# Patient Record
Sex: FEMALE | Race: WHITE | URBAN - METROPOLITAN AREA
[De-identification: names, ages, dates, MRNs, and addresses within clinical notes are randomized per-mention and may not be internally consistent; named-entity substitution may affect disease eponyms.]

---

## 2017-01-05 PROCEDURE — 88175 CYTOPATH C/V AUTO FLUID REDO: CPT | Performed by: OBSTETRICS & GYNECOLOGY

## 2017-01-05 PROCEDURE — 87624 HPV HI-RISK TYP POOLED RSLT: CPT | Performed by: OBSTETRICS & GYNECOLOGY

## 2017-07-12 PROCEDURE — 82607 VITAMIN B-12: CPT | Performed by: FAMILY MEDICINE

## 2018-02-07 PROCEDURE — 87624 HPV HI-RISK TYP POOLED RSLT: CPT | Performed by: OBSTETRICS & GYNECOLOGY

## 2018-02-07 PROCEDURE — 88175 CYTOPATH C/V AUTO FLUID REDO: CPT | Performed by: OBSTETRICS & GYNECOLOGY

## 2019-04-16 PROCEDURE — 87624 HPV HI-RISK TYP POOLED RSLT: CPT | Performed by: OBSTETRICS & GYNECOLOGY

## 2019-04-16 PROCEDURE — 88175 CYTOPATH C/V AUTO FLUID REDO: CPT | Performed by: OBSTETRICS & GYNECOLOGY

## 2022-10-20 ENCOUNTER — OFFICE VISIT (OUTPATIENT)
Dept: ENDOCRINOLOGY CLINIC | Facility: CLINIC | Age: 62
End: 2022-10-20
Payer: COMMERCIAL

## 2022-10-20 VITALS
BODY MASS INDEX: 26 KG/M2 | SYSTOLIC BLOOD PRESSURE: 103 MMHG | WEIGHT: 140.19 LBS | HEART RATE: 83 BPM | DIASTOLIC BLOOD PRESSURE: 62 MMHG

## 2022-10-20 DIAGNOSIS — M85.80 OSTEOPENIA, UNSPECIFIED LOCATION: ICD-10-CM

## 2022-10-20 DIAGNOSIS — E04.1 THYROID NODULE: Primary | ICD-10-CM

## 2022-10-20 PROCEDURE — 3078F DIAST BP <80 MM HG: CPT | Performed by: INTERNAL MEDICINE

## 2022-10-20 PROCEDURE — 99204 OFFICE O/P NEW MOD 45 MIN: CPT | Performed by: INTERNAL MEDICINE

## 2022-10-20 PROCEDURE — 3074F SYST BP LT 130 MM HG: CPT | Performed by: INTERNAL MEDICINE

## 2022-10-20 RX ORDER — PANTOPRAZOLE SODIUM 40 MG/1
TABLET, DELAYED RELEASE ORAL
COMMUNITY
Start: 2022-09-07

## 2022-10-20 RX ORDER — RIMEGEPANT SULFATE 75 MG/75MG
TABLET, ORALLY DISINTEGRATING ORAL
COMMUNITY

## 2022-12-01 ENCOUNTER — HOSPITAL ENCOUNTER (OUTPATIENT)
Dept: BONE DENSITY | Age: 62
Discharge: HOME | End: 2022-12-01
Attending: INTERNAL MEDICINE
Payer: COMMERCIAL

## 2022-12-01 DIAGNOSIS — M85.80 OSTEOPENIA, UNSPECIFIED LOCATION: ICD-10-CM

## 2022-12-01 PROCEDURE — 77080 DXA BONE DENSITY AXIAL: CPT | Performed by: INTERNAL MEDICINE

## 2022-12-05 ENCOUNTER — TELEPHONE (OUTPATIENT)
Dept: ENDOCRINOLOGY CLINIC | Facility: CLINIC | Age: 62
End: 2022-12-05

## 2022-12-09 ENCOUNTER — LAB ENCOUNTER (OUTPATIENT)
Dept: LAB | Facility: HOSPITAL | Age: 62
End: 2022-12-09
Attending: INTERNAL MEDICINE
Payer: COMMERCIAL

## 2022-12-09 DIAGNOSIS — M81.0 AGE-RELATED OSTEOPOROSIS WITHOUT CURRENT PATHOLOGICAL FRACTURE: ICD-10-CM

## 2022-12-09 DIAGNOSIS — E04.1 THYROID NODULE: ICD-10-CM

## 2022-12-09 DIAGNOSIS — E55.9 VITAMIN D DEFICIENCY: ICD-10-CM

## 2022-12-09 LAB
PTH-INTACT SERPL-MCNC: 29.8 PG/ML (ref 18.5–88)
T4 FREE SERPL-MCNC: 1.2 NG/DL (ref 0.8–1.7)
TSI SER-ACNC: 0.61 MIU/ML (ref 0.36–3.74)
VIT D+METAB SERPL-MCNC: 78.5 NG/ML (ref 30–100)

## 2022-12-09 PROCEDURE — 36415 COLL VENOUS BLD VENIPUNCTURE: CPT

## 2022-12-09 PROCEDURE — 84080 ASSAY ALKALINE PHOSPHATASES: CPT

## 2022-12-09 PROCEDURE — 83970 ASSAY OF PARATHORMONE: CPT

## 2022-12-09 PROCEDURE — 82306 VITAMIN D 25 HYDROXY: CPT

## 2022-12-09 PROCEDURE — 84443 ASSAY THYROID STIM HORMONE: CPT

## 2022-12-09 PROCEDURE — 84439 ASSAY OF FREE THYROXINE: CPT

## 2022-12-09 PROCEDURE — 82523 COLLAGEN CROSSLINKS: CPT

## 2022-12-11 LAB
BONE SPECIFIC ALKALINE PHOSPHATASE: 15.5 UG/L
C-TELOPEPTIDE, BETA-CROSS-LINK: 209 PG/ML

## 2022-12-13 ENCOUNTER — PATIENT MESSAGE (OUTPATIENT)
Dept: ENDOCRINOLOGY CLINIC | Facility: CLINIC | Age: 62
End: 2022-12-13

## 2022-12-13 NOTE — TELEPHONE ENCOUNTER
From: Latoya Meza  To: Johana Concepcion MD  Sent: 12/13/2022 10:20 AM CST  Subject: Appointment for Osteoporosis treatment    Hi Dr Elisabeth Inman, I did call your office to make an appointment to discuss treatment options for my osteoporosis diagnosis but your earliest appointment was in March. Is there any way that I could get in to see you sooner? Please let me know when you get a chance. Thank you!

## 2022-12-28 ENCOUNTER — HOSPITAL ENCOUNTER (OUTPATIENT)
Dept: ULTRASOUND IMAGING | Age: 62
Discharge: HOME | End: 2022-12-28
Attending: INTERNAL MEDICINE
Payer: COMMERCIAL

## 2022-12-28 DIAGNOSIS — E04.1 THYROID NODULE: ICD-10-CM

## 2022-12-28 PROCEDURE — 76536 US EXAM OF HEAD AND NECK: CPT | Performed by: INTERNAL MEDICINE

## 2023-01-12 ENCOUNTER — OFFICE VISIT (OUTPATIENT)
Dept: ENDOCRINOLOGY CLINIC | Facility: CLINIC | Age: 63
End: 2023-01-12

## 2023-01-12 VITALS
BODY MASS INDEX: 26 KG/M2 | WEIGHT: 140 LBS | SYSTOLIC BLOOD PRESSURE: 120 MMHG | HEART RATE: 100 BPM | DIASTOLIC BLOOD PRESSURE: 72 MMHG

## 2023-01-12 DIAGNOSIS — E04.2 MULTINODULAR GOITER: ICD-10-CM

## 2023-01-12 DIAGNOSIS — M81.0 AGE-RELATED OSTEOPOROSIS WITHOUT CURRENT PATHOLOGICAL FRACTURE: Primary | ICD-10-CM

## 2023-01-12 NOTE — PATIENT INSTRUCTIONS
24 hour urine collection: first time urinate in AM and collect urine for the rest of the day including first urination next day

## 2023-01-17 ENCOUNTER — LAB ENCOUNTER (OUTPATIENT)
Dept: LAB | Facility: HOSPITAL | Age: 63
End: 2023-01-17
Attending: INTERNAL MEDICINE
Payer: COMMERCIAL

## 2023-01-17 DIAGNOSIS — M81.0 AGE-RELATED OSTEOPOROSIS WITHOUT CURRENT PATHOLOGICAL FRACTURE: ICD-10-CM

## 2023-01-17 LAB
CALCIUM 24H UR-SRATE: 324 MG/24HR (ref 42–353)
CREAT UR-SCNC: 0.98 G/24 HR (ref 0.6–1.8)
SODIUM SERPL-SCNC: 85 MMOL/24HR (ref 40–220)
SPECIMEN VOL UR: 1800 ML

## 2023-01-17 PROCEDURE — 82570 ASSAY OF URINE CREATININE: CPT

## 2023-01-17 PROCEDURE — 84300 ASSAY OF URINE SODIUM: CPT

## 2023-01-17 PROCEDURE — 82340 ASSAY OF CALCIUM IN URINE: CPT

## 2023-01-18 ENCOUNTER — PATIENT MESSAGE (OUTPATIENT)
Dept: ENDOCRINOLOGY CLINIC | Facility: CLINIC | Age: 63
End: 2023-01-18

## 2023-01-18 DIAGNOSIS — R82.994 IDIOPATHIC HYPERCALCIURIA: Primary | ICD-10-CM

## 2023-01-19 RX ORDER — HYDROCHLOROTHIAZIDE 12.5 MG/1
12.5 TABLET ORAL DAILY
Qty: 90 TABLET | Refills: 3 | Status: SHIPPED | OUTPATIENT
Start: 2023-01-19

## 2023-01-19 NOTE — TELEPHONE ENCOUNTER
Called patient to discuss results which demonstrated idiopathic hypercalciuria.   Will start hydrochlorothiazide, verbalized understanding of risks and benefits

## 2023-01-19 NOTE — TELEPHONE ENCOUNTER
Dr. Blanca Canales    Patient has not called back. Do you want RN to try again or relay message to patient?

## 2023-03-21 ENCOUNTER — TELEPHONE (OUTPATIENT)
Dept: ENDOCRINOLOGY CLINIC | Facility: CLINIC | Age: 63
End: 2023-03-21

## 2023-03-21 RX ORDER — HYDROCHLOROTHIAZIDE 12.5 MG/1
12.5 TABLET ORAL DAILY
Qty: 90 TABLET | Refills: 2 | Status: SHIPPED | OUTPATIENT
Start: 2023-03-21

## 2023-03-21 NOTE — TELEPHONE ENCOUNTER
LOV 1/12/23 and at that time, pt was advised to follow up in one year. Rx for hydrochlorothiazide was sent to [facility] at that time. Pt's insurance is requiring her to use [facility] rather that local pharmacy. Reissued hydrochlorothiazide Rx to [facility].

## 2023-06-01 ENCOUNTER — LAB ENCOUNTER (OUTPATIENT)
Dept: LAB | Facility: HOSPITAL | Age: 63
End: 2023-06-01
Attending: INTERNAL MEDICINE
Payer: COMMERCIAL

## 2023-06-01 DIAGNOSIS — R82.994 IDIOPATHIC HYPERCALCIURIA: ICD-10-CM

## 2023-06-01 LAB
CREAT UR-SCNC: 0.86 G/24 HR (ref 0.6–1.8)
SODIUM SERPL-SCNC: 105 MMOL/24HR (ref 40–220)
SPECIMEN VOL UR: 3000 ML
SPECIMEN VOL UR: 3000 ML

## 2023-06-01 PROCEDURE — 84300 ASSAY OF URINE SODIUM: CPT

## 2023-06-01 PROCEDURE — 82570 ASSAY OF URINE CREATININE: CPT

## 2023-06-01 PROCEDURE — 82340 ASSAY OF CALCIUM IN URINE: CPT

## 2023-06-12 ENCOUNTER — TELEPHONE (OUTPATIENT)
Dept: ENDOCRINOLOGY CLINIC | Facility: CLINIC | Age: 63
End: 2023-06-12

## 2023-06-12 ENCOUNTER — PATIENT MESSAGE (OUTPATIENT)
Dept: ENDOCRINOLOGY CLINIC | Facility: CLINIC | Age: 63
End: 2023-06-12

## 2023-06-12 DIAGNOSIS — R82.994 IDIOPATHIC HYPERCALCIURIA: Primary | ICD-10-CM

## 2023-06-12 DIAGNOSIS — M81.0 AGE-RELATED OSTEOPOROSIS WITHOUT CURRENT PATHOLOGICAL FRACTURE: ICD-10-CM

## 2023-06-12 NOTE — TELEPHONE ENCOUNTER
From: Mukul Mercedes  To: Ramón Higginbotham MD  Sent: 6/12/2023 1:39 PM CDT  Subject: 24 hour urine collection kit    Hi Dr Chadd Tejeda, I am at the lab in [place] right now and they said that a new kit has not been called in regarding the lab error on my recent test. I do not live close to this facility and am hoping that I can wait here and a kit can be called in. Please let me know if this is possible.

## 2023-06-16 ENCOUNTER — TELEPHONE (OUTPATIENT)
Dept: ENDOCRINOLOGY CLINIC | Facility: CLINIC | Age: 63
End: 2023-06-16

## 2023-06-16 ENCOUNTER — LAB ENCOUNTER (OUTPATIENT)
Dept: LAB | Facility: HOSPITAL | Age: 63
End: 2023-06-16
Attending: INTERNAL MEDICINE
Payer: COMMERCIAL

## 2023-06-16 DIAGNOSIS — M81.0 AGE-RELATED OSTEOPOROSIS WITHOUT CURRENT PATHOLOGICAL FRACTURE: ICD-10-CM

## 2023-06-16 DIAGNOSIS — R82.994 IDIOPATHIC HYPERCALCIURIA: ICD-10-CM

## 2023-06-16 LAB
CALCIUM 24H UR-SRATE: 364 MG/24HR (ref 42–353)
CREAT UR-SCNC: 0.91 G/24 HR (ref 0.6–1.8)
SODIUM SERPL-SCNC: 104 MMOL/24HR (ref 40–220)
SPECIMEN VOL UR: 2600 ML

## 2023-06-16 PROCEDURE — 82340 ASSAY OF CALCIUM IN URINE: CPT

## 2023-06-16 PROCEDURE — 84300 ASSAY OF URINE SODIUM: CPT

## 2023-06-16 PROCEDURE — 82570 ASSAY OF URINE CREATININE: CPT

## 2023-06-16 NOTE — TELEPHONE ENCOUNTER
Please call lab to make sure they are running 24 hour calcium since there was an error last time. Thanks.

## 2023-06-17 ENCOUNTER — PATIENT MESSAGE (OUTPATIENT)
Dept: ENDOCRINOLOGY CLINIC | Facility: CLINIC | Age: 63
End: 2023-06-17

## 2023-06-18 RX ORDER — HYDROCHLOROTHIAZIDE 25 MG/1
25 TABLET ORAL DAILY
Qty: 90 TABLET | Refills: 1 | Status: SHIPPED | OUTPATIENT
Start: 2023-06-18

## 2023-12-02 ENCOUNTER — PATIENT MESSAGE (OUTPATIENT)
Dept: ENDOCRINOLOGY CLINIC | Facility: CLINIC | Age: 63
End: 2023-12-02

## 2023-12-02 DIAGNOSIS — M81.0 AGE-RELATED OSTEOPOROSIS WITHOUT CURRENT PATHOLOGICAL FRACTURE: Primary | ICD-10-CM

## 2023-12-02 DIAGNOSIS — R82.994 IDIOPATHIC HYPERCALCIURIA: ICD-10-CM

## 2023-12-04 NOTE — TELEPHONE ENCOUNTER
Dr. Alexandria Ferrara, see patient message. LOV was 1/12/23, no FU scheduled. Last Dexa scan was 12/1/22.

## 2023-12-04 NOTE — TELEPHONE ENCOUNTER
From: Beata Monday  To: Emmons Slate  Sent: 12/2/2023 7:48 AM CST  Subject: 24 hour urine collection kit    Hi Dr. Peter Zapien, I would like to repeat the 24 hour urine collection test to see if I am absorbing calcium now that I have been on the higher dose of hydrochlorothiazide for a few months. Can you please send in a script for me? Also, should I be scheduling another Dexascan at this point?

## 2023-12-14 ENCOUNTER — LAB ENCOUNTER (OUTPATIENT)
Dept: LAB | Facility: HOSPITAL | Age: 63
End: 2023-12-14
Attending: INTERNAL MEDICINE
Payer: COMMERCIAL

## 2023-12-14 ENCOUNTER — PATIENT MESSAGE (OUTPATIENT)
Dept: ENDOCRINOLOGY CLINIC | Facility: CLINIC | Age: 63
End: 2023-12-14

## 2023-12-14 DIAGNOSIS — R82.994 IDIOPATHIC HYPERCALCIURIA: ICD-10-CM

## 2023-12-14 LAB
CALCIUM 24H UR-SRATE: 276 MG/24HR (ref 100–300)
CREAT UR-SCNC: 0.95 G/24 HR (ref 0.6–1.8)
SODIUM SERPL-SCNC: 161 MMOL/L/24HR (ref 40–220)
SPECIMEN VOL UR: 2300 ML

## 2023-12-14 PROCEDURE — 82340 ASSAY OF CALCIUM IN URINE: CPT

## 2023-12-14 PROCEDURE — 82570 ASSAY OF URINE CREATININE: CPT

## 2023-12-14 PROCEDURE — 84300 ASSAY OF URINE SODIUM: CPT

## 2023-12-19 RX ORDER — HYDROCHLOROTHIAZIDE 25 MG/1
25 TABLET ORAL DAILY
Qty: 90 TABLET | Refills: 0 | Status: SHIPPED | OUTPATIENT
Start: 2023-12-19

## 2023-12-19 NOTE — TELEPHONE ENCOUNTER
LOV;01/12/23    RTC;1year    FU;No F/U mychart sent    Pending Monthly Supply;order pending, approve if appropriate.

## 2024-03-18 RX ORDER — HYDROCHLOROTHIAZIDE 25 MG/1
25 TABLET ORAL DAILY
Qty: 90 TABLET | Refills: 0 | Status: SHIPPED | OUTPATIENT
Start: 2024-03-18

## 2024-03-18 NOTE — TELEPHONE ENCOUNTER
Endocrine Refill protocol for oral and injectable diabetic medications      Protocol Criteria:    -Appointment with Endocrinology completed in the last 6 months or scheduled in the next 3 months    -A1c result <8.5% in the past 6 months      Verify the above has been completed or scheduled in the appropriate timeline. If so can send a 90 day supply with 1 refill.            Last completed office visit:01/12/23  Next scheduled Follow up: No F/U MyChart sent

## 2024-06-06 RX ORDER — HYDROCHLOROTHIAZIDE 25 MG/1
25 TABLET ORAL DAILY
Qty: 90 TABLET | Refills: 1 | Status: SHIPPED | OUTPATIENT
Start: 2024-06-06

## 2024-06-06 NOTE — TELEPHONE ENCOUNTER
Endocrine Refill protocol for oral antihypertensive medications    Protocol Criteria:    -Appointment with Endocrinology completed in the last 6 months or scheduled in the next 3 months    -BMP or CMP has been completed in the last 12 months     -GFR is greater than or equal to 50    Verify the above has been completed or scheduled in the appropriate timeline. If so can send a 90 day supply with 1 refill.   Verify BMP or CMP has been completed in last year  Verify last GFR result     Last completed office visit: 01/12/2023  Next scheduled Follow up: 08/22/2024  Future Appointments   Date Time Provider Department Center   8/22/2024 11:30 AM Becki Brar MD [facility]      Last BMP or CMP completion date:  Lab Results   Component Value Date    GLU 98 10/07/2020    BUN 13.0 10/07/2020    CREATSERUM 0.72 10/07/2020    BUNCREA 18.0 10/07/2020    CA 9.9 10/07/2020     10/07/2020    K 4.01 10/07/2020     10/07/2020    CO2 25.0 10/07/2020       Lab Results   Component Value Date    GLU 98 10/07/2020    BUN 13.0 10/07/2020    BUNCREA 18.0 10/07/2020    CREATSERUM 0.72 10/07/2020    CA 9.9 10/07/2020    ALKPHO 82 10/07/2020    AST 29 10/07/2020    ALT 18 10/07/2020    BILT 0.62 10/07/2020    TP 7.7 10/07/2020    ALB 4.6 10/07/2020    AGRATIO 1.7 01/19/2015     10/07/2020    K 4.01 10/07/2020     10/07/2020    CO2 25.0 10/07/2020       GFR result:Last Cr was 0.72 done on 10/7/2020.

## 2024-12-03 ENCOUNTER — HOSPITAL ENCOUNTER (OUTPATIENT)
Dept: BONE DENSITY | Age: 64
Discharge: HOME | End: 2024-12-03
Attending: INTERNAL MEDICINE
Payer: COMMERCIAL

## 2024-12-03 DIAGNOSIS — M81.0 AGE-RELATED OSTEOPOROSIS WITHOUT CURRENT PATHOLOGICAL FRACTURE: ICD-10-CM

## 2024-12-03 PROCEDURE — 77080 DXA BONE DENSITY AXIAL: CPT | Performed by: INTERNAL MEDICINE

## 2024-12-12 ENCOUNTER — OFFICE VISIT (OUTPATIENT)
Dept: ENDOCRINOLOGY CLINIC | Facility: CLINIC | Age: 64
End: 2024-12-12
Payer: COMMERCIAL

## 2024-12-12 VITALS
WEIGHT: 140 LBS | SYSTOLIC BLOOD PRESSURE: 99 MMHG | DIASTOLIC BLOOD PRESSURE: 67 MMHG | HEART RATE: 84 BPM | HEIGHT: 62 IN | BODY MASS INDEX: 25.76 KG/M2

## 2024-12-12 DIAGNOSIS — E04.1 THYROID NODULE: Primary | ICD-10-CM

## 2024-12-12 DIAGNOSIS — R82.994 IDIOPATHIC HYPERCALCIURIA: ICD-10-CM

## 2024-12-12 DIAGNOSIS — M81.0 AGE-RELATED OSTEOPOROSIS WITHOUT CURRENT PATHOLOGICAL FRACTURE: ICD-10-CM

## 2024-12-12 PROCEDURE — 99214 OFFICE O/P EST MOD 30 MIN: CPT | Performed by: INTERNAL MEDICINE

## 2024-12-12 NOTE — PROGRESS NOTES
Name: Fani Wood  Date: 12/12/2024    Referring Physician: No ref. provider found    Chief Complaint   Patient presents with    Osteoporosis     Pt is in for a Osteoporosis follow up. No recent falls/trips No fracture or broken bones       HISTORY OF PRESENT ILLNESS   Fani Wood is a 63 year old female who presents for   Chief Complaint   Patient presents with    Osteoporosis     Pt is in for a Osteoporosis follow up. No recent falls/trips No fracture or broken bones     62 y/o F presents for follow up evaluation of hypothyroidism.  She was diagnosed with thyroid disease in 2002 and previously followed by Dr. Bosch.  During public health emergency thyroid was followed by her PCP.  At that time she was noted to have significant tachycardia on exam and also started to have episodes of pre-syncope.  So she is currently wearing holter monitor to evaluate HR and scheduled to undergo echocardiogram.   She denies any symptoms of palpitations but noted to have elevated HR on apple watch.      Since last visit she stopped levothyroxine and TFTs are normal.      Compression Symptoms:  Dysphagia: Yes, evaluated by GI and started on PPI   Dyspnea: No  Voice Change: No   Anterior Neck Pain: No    Thyroid US 9/2019: Nodules - R Lobe [id number]; L lobe nodule 9x6x7; [id number]     Thyroid FNA 2011; L thyroid nodule 1.2cm Low cellularity, follicular epithelium     Osteopenia: Tscore -1.9 lumbar spine; -1.4 femoral neck     Maternal h/o thyroid disease     REVIEW OF SYSTEMS  Eyes: no change in vision  Neurologic: no headache, generalized or focal weakness or numbness.  Head: normal  ENT: normal  Lungs: no shortness of breath, wheezing or WEINER  Cardiovascular:  no chest pain or palpitations  Gastrointestinal:  no abdominal pain, bowel movement problems  Musculoskeletal: no muscle pain or arthralgia  /Gyne: no frequency or discomfort while urinating  Psychiatric:  no acute distress, anxiety  or depression  Skin: normal  moisturized skin    Medications:     Current Outpatient Medications:     HYDROCHLOROTHIAZIDE 25 MG Oral Tab, TAKE ONE TABLET BY MOUTH ONE TIME DAILY, Disp: 90 tablet, Rfl: 1    Cholecalciferol (VITAMIN D3) 2000 UNITS Oral Tab, Take by mouth., Disp: , Rfl:     hydroCHLOROthiazide 12.5 MG Oral Tab, Take 1 tablet (12.5 mg total) by mouth daily., Disp: 90 tablet, Rfl: 2    pantoprazole 40 MG Oral Tab EC, TAKE 1 TABLET BY MOUTH ONCE A DAY 30 MINUTES BEFORE BREAKFAST, Disp: , Rfl:     Rimegepant Sulfate (NURTEC) 75 MG Oral Tablet Dispersible, Take by mouth., Disp: , Rfl:     mupirocin 2 % External Ointment, Apply 1 Application topically 3 (three) times daily., Disp: 30 g, Rfl: 1    Galcanezumab-gnlm 120 MG/ML Subcutaneous Solution Auto-injector, Inject into the skin. (Patient not taking: No sig reported), Disp: , Rfl:     Eletriptan Hydrobromide (RELPAX) 40 MG Oral Tab, TAKE ONE TABLET BY MOUTH AT ONSET OF MIGRAINE AND MAY REPEAT ONCE AFTER 2 HOURS (Patient not taking: No sig reported), Disp: 6 tablet, Rfl: 1    Magnesium 200 MG Oral Tab, Take by mouth daily., Disp: , Rfl:      Allergies:   Allergies   Allergen Reactions    Avelox [Moxifloxacin] NAUSEA ONLY    Augmentin [Amoclan]     Calan [Verapamil] HIVES    Cortisone     Polysporin [Bacitracin-Polymyxin B] RASH    Topamax [Topiramate] HIVES    Toradol HIVES       Social History:   Social History     Socioeconomic History    Marital status:    Tobacco Use    Smoking status: Never    Smokeless tobacco: Never   Vaping Use    Vaping status: Never Used   Substance and Sexual Activity    Alcohol use: Yes     Alcohol/week: 0.0 standard drinks of alcohol     Comment: rare    Drug use: No    Sexual activity: Yes       Medical History:   Past Medical History:    Breast injury    fall onto left breast    Hypothyroidism    Migraines       Surgical history:   Past Surgical History:   Procedure Laterality Date    D & c  1994       PHYSICAL EXAMINATION:  BP 99/67   Pulse 84    Ht 5' 2\" (1.575 m)   Wt 140 lb (63.5 kg)   LMP 11/17/2015   BMI 25.61 kg/m²     General Appearance:  Alert, in no acute distress, well developed  Eyes: normal conjunctivae, sclera.  Ears/Nose/Mouth/Throat/Neck:  normal hearing, normal speech and diffusely enlarged thyroid gland  Musculoskeletal:  normal muscle strength and tone  PV: normal pulses of carotids, pedals  Skin:  normal moisture and skin texture  Hair & Nails:  normal scalp hair     Neuro:  sensory grossly intact and motor grossly intact  Psychiatric:  oriented to time, self, and place  Nutritional:  no abnormal weight gain or loss    ASSESSMENT/PLAN:    1. Thyroid Nodules  -Discussed common occurrence of thyroid nodules in the population approx 50-60% of the population  -Discussed risk of malignancy is approx 3-5%, 95-97% of nodules are benign  -Discussed recommendation to perform FNA biopsy of nodules greater than 1cm in size  -Discussed that if nodule is benign then plan to follow with yearly thyroid ultrasound  -Previous FNA was benign  -Thyroid US is stable - recheck imaging  -Normal TFTs, no need for levothyroxine therapy   -Check TSH, FT4     2. Osteoporosis  - Discussed diagnosis at length today   - Unclear reason for progression although some family h/o osteoporosis and she has been on PPI therapy  - Continue Vitamin D  - Continue calcium 600mg PO BID  - 24 hour urine demonstrates idiopathic hypercalciuria   - Continue hydrochlorothiazide   - DEXA improvd 2024 - no need for pharmacologic therapy     RTC 1 year       12/12/2024  Becki Brar MD

## 2024-12-14 NOTE — TELEPHONE ENCOUNTER
Endocrine Refill protocol for oral medications    Protocol Criteria:  PASSED  Reason: N/A    If below requirement is met, send a 90-day supply with 1 refill per provider protocol.    Verify appointment with Endocrinology completed in the last 6 months or scheduled in the next 3 months.    Last completed office visit: 12/12/2024 Becki Brar MD   Next scheduled Follow up: No future appointments.

## 2024-12-16 RX ORDER — HYDROCHLOROTHIAZIDE 25 MG/1
25 TABLET ORAL DAILY
Qty: 90 TABLET | Refills: 0 | Status: SHIPPED | OUTPATIENT
Start: 2024-12-16

## 2025-03-11 RX ORDER — HYDROCHLOROTHIAZIDE 25 MG/1
25 TABLET ORAL DAILY
Qty: 90 TABLET | Refills: 0 | Status: SHIPPED | OUTPATIENT
Start: 2025-03-11

## 2025-03-11 NOTE — TELEPHONE ENCOUNTER
Endocrine Refill protocol for oral medications    Protocol Criteria:  PASSED  Reason: N/A    If below requirement is met, send a 90-day supply with 1 refill per provider protocol.    Verify appointment with Endocrinology completed in the last 6 months or scheduled in the next 3 months.    Last completed office visit: 12/12/2024 Becki Brar MD   Next scheduled Follow up: No future appointments.      
6 (moderate pain)

## 2025-04-15 ENCOUNTER — HOSPITAL ENCOUNTER (OUTPATIENT)
Dept: ULTRASOUND IMAGING | Age: 65
Discharge: HOME | End: 2025-04-15
Attending: INTERNAL MEDICINE
Payer: COMMERCIAL

## 2025-04-15 ENCOUNTER — LAB ENCOUNTER (OUTPATIENT)
Dept: LAB | Facility: REFERENCE LAB | Age: 65
End: 2025-04-15
Attending: INTERNAL MEDICINE
Payer: COMMERCIAL

## 2025-04-15 DIAGNOSIS — E04.1 THYROID NODULE: ICD-10-CM

## 2025-04-15 LAB
T4 FREE SERPL-MCNC: 1.4 NG/DL (ref 0.8–1.7)
TSI SER-ACNC: 0.58 UIU/ML (ref 0.55–4.78)

## 2025-04-15 PROCEDURE — 84443 ASSAY THYROID STIM HORMONE: CPT

## 2025-04-15 PROCEDURE — 36415 COLL VENOUS BLD VENIPUNCTURE: CPT

## 2025-04-15 PROCEDURE — 84439 ASSAY OF FREE THYROXINE: CPT

## 2025-04-15 PROCEDURE — 76536 US EXAM OF HEAD AND NECK: CPT | Performed by: INTERNAL MEDICINE

## 2025-05-19 RX ORDER — HYDROCHLOROTHIAZIDE 25 MG/1
25 TABLET ORAL DAILY
Qty: 90 TABLET | Refills: 0 | Status: SHIPPED | OUTPATIENT
Start: 2025-05-19

## 2025-05-19 NOTE — TELEPHONE ENCOUNTER
Endocrine Refill protocol for oral medications    Protocol Criteria:  PASSED  Reason: N/A    If below requirement is met, send a 90-day supply with 1 refill per provider protocol.    Verify appointment with Endocrinology completed in the last 6 months or scheduled in the next 3 months.    Last completed office visit:12/12/2024 Becki Brar MD   Last completed telemed visit: Visit date not found  Next scheduled Follow up: No future appointments.

## 2025-08-29 RX ORDER — HYDROCHLOROTHIAZIDE 25 MG/1
25 TABLET ORAL DAILY
Qty: 90 TABLET | Refills: 0 | Status: SHIPPED | OUTPATIENT
Start: 2025-08-29